# Patient Record
Sex: MALE | Race: WHITE | NOT HISPANIC OR LATINO | Employment: FULL TIME | URBAN - METROPOLITAN AREA
[De-identification: names, ages, dates, MRNs, and addresses within clinical notes are randomized per-mention and may not be internally consistent; named-entity substitution may affect disease eponyms.]

---

## 2018-10-29 ENCOUNTER — HOSPITAL ENCOUNTER (OUTPATIENT)
Dept: GENERAL RADIOLOGY | Facility: HOSPITAL | Age: 48
Discharge: HOME | End: 2018-10-29
Attending: INTERNAL MEDICINE | Admitting: INTERNAL MEDICINE

## 2018-10-29 ENCOUNTER — TRANSCRIBE ORDERS (OUTPATIENT)
Dept: ADMINISTRATIVE | Facility: HOSPITAL | Age: 48
End: 2018-10-29

## 2018-10-29 DIAGNOSIS — M54.6 THORACIC BACK PAIN, UNSPECIFIED BACK PAIN LATERALITY, UNSPECIFIED CHRONICITY: ICD-10-CM

## 2018-10-29 DIAGNOSIS — M25.511 CHRONIC RIGHT SHOULDER PAIN: Primary | ICD-10-CM

## 2018-10-29 DIAGNOSIS — G89.29 CHRONIC RIGHT SHOULDER PAIN: Primary | ICD-10-CM

## 2018-10-29 DIAGNOSIS — M25.511 CHRONIC RIGHT SHOULDER PAIN: ICD-10-CM

## 2018-10-29 DIAGNOSIS — G89.29 CHRONIC RIGHT SHOULDER PAIN: ICD-10-CM

## 2018-10-29 PROCEDURE — 73030 X-RAY EXAM OF SHOULDER: CPT

## 2018-10-29 PROCEDURE — 72072 X-RAY EXAM THORAC SPINE 3VWS: CPT

## 2018-11-01 ENCOUNTER — TRANSCRIBE ORDERS (OUTPATIENT)
Dept: ADMINISTRATIVE | Facility: HOSPITAL | Age: 48
End: 2018-11-01

## 2018-11-01 DIAGNOSIS — G89.29 CHRONIC RIGHT SHOULDER PAIN: Primary | ICD-10-CM

## 2018-11-01 DIAGNOSIS — M54.6 THORACIC BACK PAIN, UNSPECIFIED BACK PAIN LATERALITY, UNSPECIFIED CHRONICITY: ICD-10-CM

## 2018-11-01 DIAGNOSIS — M25.511 CHRONIC RIGHT SHOULDER PAIN: Primary | ICD-10-CM

## 2018-11-09 ENCOUNTER — HOSPITAL ENCOUNTER (OUTPATIENT)
Dept: MRI IMAGING | Facility: HOSPITAL | Age: 48
Discharge: HOME | End: 2018-11-09
Attending: INTERNAL MEDICINE | Admitting: INTERNAL MEDICINE

## 2018-11-09 ENCOUNTER — HOSPITAL ENCOUNTER (OUTPATIENT)
Dept: MRI IMAGING | Facility: HOSPITAL | Age: 48
Discharge: HOME | End: 2018-11-09
Attending: INTERNAL MEDICINE

## 2018-11-09 DIAGNOSIS — M25.511 CHRONIC RIGHT SHOULDER PAIN: ICD-10-CM

## 2018-11-09 DIAGNOSIS — G89.29 CHRONIC RIGHT SHOULDER PAIN: ICD-10-CM

## 2018-11-09 DIAGNOSIS — M54.6 THORACIC BACK PAIN, UNSPECIFIED BACK PAIN LATERALITY, UNSPECIFIED CHRONICITY: ICD-10-CM

## 2018-11-09 PROCEDURE — 73221 MRI JOINT UPR EXTREM W/O DYE: CPT

## 2018-11-09 PROCEDURE — 72146 MRI CHEST SPINE W/O DYE: CPT

## 2018-11-14 ENCOUNTER — APPOINTMENT (OUTPATIENT)
Dept: MRI IMAGING | Facility: HOSPITAL | Age: 48
End: 2018-11-14
Attending: INTERNAL MEDICINE

## 2018-12-26 ENCOUNTER — HOSPITAL ENCOUNTER (OUTPATIENT)
Dept: GENERAL RADIOLOGY | Facility: HOSPITAL | Age: 48
Discharge: HOME | End: 2018-12-26
Admitting: NURSE PRACTITIONER

## 2018-12-26 ENCOUNTER — TRANSCRIBE ORDERS (OUTPATIENT)
Dept: ADMINISTRATIVE | Facility: HOSPITAL | Age: 48
End: 2018-12-26

## 2018-12-26 DIAGNOSIS — R29.898 SUSPECTED FRACTURE OF BONE: Primary | ICD-10-CM

## 2018-12-26 PROCEDURE — 73630 X-RAY EXAM OF FOOT: CPT

## 2019-09-19 ENCOUNTER — TRANSCRIBE ORDERS (OUTPATIENT)
Dept: ADMINISTRATIVE | Facility: HOSPITAL | Age: 49
End: 2019-09-19

## 2019-09-19 DIAGNOSIS — G43.901 STATUS MIGRAINOSUS: Primary | ICD-10-CM

## 2019-09-26 ENCOUNTER — HOSPITAL ENCOUNTER (OUTPATIENT)
Dept: MRI IMAGING | Facility: HOSPITAL | Age: 49
Discharge: HOME | End: 2019-09-26
Admitting: INTERNAL MEDICINE

## 2019-09-26 DIAGNOSIS — G43.901 STATUS MIGRAINOSUS: ICD-10-CM

## 2019-09-26 PROCEDURE — 70551 MRI BRAIN STEM W/O DYE: CPT

## 2024-12-20 ENCOUNTER — HOSPITAL ENCOUNTER (EMERGENCY)
Facility: HOSPITAL | Age: 54
Discharge: HOME | End: 2024-12-21
Attending: STUDENT IN AN ORGANIZED HEALTH CARE EDUCATION/TRAINING PROGRAM
Payer: COMMERCIAL

## 2024-12-20 ENCOUNTER — APPOINTMENT (OUTPATIENT)
Facility: HOSPITAL | Age: 54
End: 2024-12-20
Payer: COMMERCIAL

## 2024-12-20 DIAGNOSIS — F10.920 ALCOHOLIC INTOXICATION WITHOUT COMPLICATION: Primary | ICD-10-CM

## 2024-12-20 DIAGNOSIS — F99 PSYCHIATRIC PROBLEM: ICD-10-CM

## 2024-12-20 DIAGNOSIS — S42.251A CLOSED DISPLACED FRACTURE OF GREATER TUBEROSITY OF RIGHT HUMERUS, INITIAL ENCOUNTER: ICD-10-CM

## 2024-12-20 LAB
ALBUMIN SERPL-MCNC: 4.9 G/DL (ref 3.5–5.2)
ALBUMIN/GLOB SERPL: 1.9 G/DL
ALP SERPL-CCNC: 57 U/L (ref 39–117)
ALT SERPL W P-5'-P-CCNC: 32 U/L (ref 1–41)
AMPHET+METHAMPHET UR QL: NEGATIVE
AMPHETAMINES UR QL: NEGATIVE
ANION GAP SERPL CALCULATED.3IONS-SCNC: 9.7 MMOL/L (ref 5–15)
APAP SERPL-MCNC: <5 MCG/ML (ref 0–30)
AST SERPL-CCNC: 38 U/L (ref 1–40)
BARBITURATES UR QL SCN: NEGATIVE
BASOPHILS # BLD AUTO: 0.02 10*3/MM3 (ref 0–0.2)
BASOPHILS NFR BLD AUTO: 0.4 % (ref 0–1.5)
BENZODIAZ UR QL SCN: NEGATIVE
BILIRUB SERPL-MCNC: <0.2 MG/DL (ref 0–1.2)
BUN SERPL-MCNC: 12 MG/DL (ref 6–20)
BUN/CREAT SERPL: 11.8 (ref 7–25)
BUPRENORPHINE SERPL-MCNC: NEGATIVE NG/ML
CALCIUM SPEC-SCNC: 9.4 MG/DL (ref 8.6–10.5)
CANNABINOIDS SERPL QL: NEGATIVE
CHLORIDE SERPL-SCNC: 107 MMOL/L (ref 98–107)
CO2 SERPL-SCNC: 28.3 MMOL/L (ref 22–29)
COCAINE UR QL: NEGATIVE
CREAT SERPL-MCNC: 1.02 MG/DL (ref 0.76–1.27)
DEPRECATED RDW RBC AUTO: 40.4 FL (ref 37–54)
EGFRCR SERPLBLD CKD-EPI 2021: 88.4 ML/MIN/1.73
EOSINOPHIL # BLD AUTO: 0.02 10*3/MM3 (ref 0–0.4)
EOSINOPHIL NFR BLD AUTO: 0.4 % (ref 0.3–6.2)
ERYTHROCYTE [DISTWIDTH] IN BLOOD BY AUTOMATED COUNT: 12.2 % (ref 12.3–15.4)
ETHANOL BLD-MCNC: 297 MG/DL (ref 0–10)
FENTANYL UR-MCNC: NEGATIVE NG/ML
GLOBULIN UR ELPH-MCNC: 2.6 GM/DL
GLUCOSE SERPL-MCNC: 92 MG/DL (ref 65–99)
HCT VFR BLD AUTO: 45.4 % (ref 37.5–51)
HGB BLD-MCNC: 14.9 G/DL (ref 13–17.7)
HOLD SPECIMEN: NORMAL
IMM GRANULOCYTES # BLD AUTO: 0.01 10*3/MM3 (ref 0–0.05)
IMM GRANULOCYTES NFR BLD AUTO: 0.2 % (ref 0–0.5)
LYMPHOCYTES # BLD AUTO: 2.08 10*3/MM3 (ref 0.7–3.1)
LYMPHOCYTES NFR BLD AUTO: 38.5 % (ref 19.6–45.3)
MCH RBC QN AUTO: 29.3 PG (ref 26.6–33)
MCHC RBC AUTO-ENTMCNC: 32.8 G/DL (ref 31.5–35.7)
MCV RBC AUTO: 89.2 FL (ref 79–97)
METHADONE UR QL SCN: NEGATIVE
MONOCYTES # BLD AUTO: 0.23 10*3/MM3 (ref 0.1–0.9)
MONOCYTES NFR BLD AUTO: 4.3 % (ref 5–12)
NEUTROPHILS NFR BLD AUTO: 3.04 10*3/MM3 (ref 1.7–7)
NEUTROPHILS NFR BLD AUTO: 56.2 % (ref 42.7–76)
OPIATES UR QL: NEGATIVE
OXYCODONE UR QL SCN: NEGATIVE
PCP UR QL SCN: NEGATIVE
PLATELET # BLD AUTO: 351 10*3/MM3 (ref 140–450)
PMV BLD AUTO: 10.7 FL (ref 6–12)
POTASSIUM SERPL-SCNC: 4.3 MMOL/L (ref 3.5–5.2)
PROT SERPL-MCNC: 7.5 G/DL (ref 6–8.5)
RBC # BLD AUTO: 5.09 10*6/MM3 (ref 4.14–5.8)
SALICYLATES SERPL-MCNC: <0.5 MG/DL
SODIUM SERPL-SCNC: 145 MMOL/L (ref 136–145)
TRICYCLICS UR QL SCN: NEGATIVE
WBC NRBC COR # BLD AUTO: 5.4 10*3/MM3 (ref 3.4–10.8)
WHOLE BLOOD HOLD SPECIMEN: NORMAL

## 2024-12-20 PROCEDURE — 99285 EMERGENCY DEPT VISIT HI MDM: CPT

## 2024-12-20 PROCEDURE — 82077 ASSAY SPEC XCP UR&BREATH IA: CPT | Performed by: STUDENT IN AN ORGANIZED HEALTH CARE EDUCATION/TRAINING PROGRAM

## 2024-12-20 PROCEDURE — 80143 DRUG ASSAY ACETAMINOPHEN: CPT | Performed by: STUDENT IN AN ORGANIZED HEALTH CARE EDUCATION/TRAINING PROGRAM

## 2024-12-20 PROCEDURE — 93005 ELECTROCARDIOGRAM TRACING: CPT | Performed by: STUDENT IN AN ORGANIZED HEALTH CARE EDUCATION/TRAINING PROGRAM

## 2024-12-20 PROCEDURE — 80179 DRUG ASSAY SALICYLATE: CPT | Performed by: STUDENT IN AN ORGANIZED HEALTH CARE EDUCATION/TRAINING PROGRAM

## 2024-12-20 PROCEDURE — 80053 COMPREHEN METABOLIC PANEL: CPT | Performed by: STUDENT IN AN ORGANIZED HEALTH CARE EDUCATION/TRAINING PROGRAM

## 2024-12-20 PROCEDURE — 36415 COLL VENOUS BLD VENIPUNCTURE: CPT

## 2024-12-20 PROCEDURE — 73030 X-RAY EXAM OF SHOULDER: CPT

## 2024-12-20 PROCEDURE — 80307 DRUG TEST PRSMV CHEM ANLYZR: CPT | Performed by: STUDENT IN AN ORGANIZED HEALTH CARE EDUCATION/TRAINING PROGRAM

## 2024-12-20 PROCEDURE — 85025 COMPLETE CBC W/AUTO DIFF WBC: CPT | Performed by: STUDENT IN AN ORGANIZED HEALTH CARE EDUCATION/TRAINING PROGRAM

## 2024-12-20 RX ORDER — FENOFIBRATE 145 MG/1
1 TABLET, COATED ORAL DAILY
COMMUNITY

## 2024-12-20 RX ORDER — SODIUM CHLORIDE 0.9 % (FLUSH) 0.9 %
10 SYRINGE (ML) INJECTION AS NEEDED
Status: DISCONTINUED | OUTPATIENT
Start: 2024-12-20 | End: 2024-12-21 | Stop reason: HOSPADM

## 2024-12-20 RX ORDER — METOPROLOL SUCCINATE 50 MG/1
1 TABLET, EXTENDED RELEASE ORAL DAILY
COMMUNITY

## 2024-12-20 RX ORDER — IBUPROFEN 200 MG
400 TABLET ORAL ONCE
Status: COMPLETED | OUTPATIENT
Start: 2024-12-20 | End: 2024-12-21

## 2024-12-21 VITALS
HEART RATE: 73 BPM | WEIGHT: 188 LBS | BODY MASS INDEX: 29.51 KG/M2 | RESPIRATION RATE: 19 BRPM | SYSTOLIC BLOOD PRESSURE: 152 MMHG | OXYGEN SATURATION: 96 % | HEIGHT: 67 IN | TEMPERATURE: 97.9 F | DIASTOLIC BLOOD PRESSURE: 98 MMHG

## 2024-12-21 LAB
ETHANOL BLD-MCNC: 174 MG/DL (ref 0–10)
ETHANOL BLD-MCNC: 92 MG/DL (ref 0–10)

## 2024-12-21 PROCEDURE — 82077 ASSAY SPEC XCP UR&BREATH IA: CPT | Performed by: STUDENT IN AN ORGANIZED HEALTH CARE EDUCATION/TRAINING PROGRAM

## 2024-12-21 RX ADMIN — IBUPROFEN 400 MG: 200 TABLET, FILM COATED ORAL at 04:27
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
## 2024-12-21 NOTE — FSED PROVIDER NOTE
"Subjective  History of Present Illness:    Patient is a 54-year-old male with history of hypertension, alcohol use disorder presents to the emergency department with wife for concerns of suicidal ideation and alcohol intoxication.  Wife states that patient drink heavily today due to it being the holidays.  States that he was making statements that he would be better off dead.  She states that he does not remember saying this as he has been intoxicated.  She tried to take him to the [facility] for inpatient treatment, however, she was directed to come to the emergency department due to his intoxication.  Patient currently denies any SI, HI, hallucinations.  He states that he has had alcohol withdrawals in the past but he has never had alcohol withdrawal seizures.  States that he does not drink every day.  Patient notes having right shoulder pain due to a fall that occurred a week ago.  Otherwise, denies chest pain, shortness of breath, headache.      Nurses Notes reviewed and agree, including vitals, allergies, social history and prior medical history.     REVIEW OF SYSTEMS: All systems reviewed and not pertinent unless noted.  Review of Systems    Past Medical History:   Diagnosis Date    GERD (gastroesophageal reflux disease)     Hypertension     Pancreatitis        Allergies:    Patient has no known allergies.      Past Surgical History:   Procedure Laterality Date    HERNIA REPAIR           Social History     Socioeconomic History    Marital status:          History reviewed. No pertinent family history.    Objective  Physical Exam:  /98 (BP Location: Left arm, Patient Position: Lying)   Pulse 73   Temp 97.9 °F (36.6 °C) (Oral)   Resp 19   Ht 170.2 cm (67\")   Wt 85.3 kg (188 lb)   SpO2 96%   BMI 29.44 kg/m²      Physical Exam  HENT:      Head: Normocephalic and atraumatic.   Eyes:      Extraocular Movements: Extraocular movements intact.      Pupils: Pupils are equal, round, and reactive to light. "   Cardiovascular:      Rate and Rhythm: Normal rate and regular rhythm.   Pulmonary:      Effort: Pulmonary effort is normal.      Breath sounds: Normal breath sounds.   Abdominal:      General: There is no distension.      Tenderness: There is no abdominal tenderness.   Musculoskeletal:      Cervical back: No tenderness.      Comments: Tenderness to right shoulder with limited abduction, radial pulse 2+   Neurological:      General: No focal deficit present.      Mental Status: He is alert.         FX Dislocation    Date/Time: 12/21/2024 4:15 AM    Performed by: Reta Andre MD  Authorized by: Reta Andre MD    Consent:     Consent obtained:  Verbal    Consent given by:  Patient  Procedure details:     Immobilization:  Sling  Post-procedure details:     Distal neurologic exam:  Normal    Distal perfusion: distal pulses strong    Comments:      Fracture not requiring manipulation.  Closed fracture of right humerus greater tuberosity.  Patient was placed in sling for immobilization.  Remained neurovascularly intact.  Recommend follow-up with orthopedics in 5 to 7 days for      ED Course:         Lab Results (last 24 hours)       Procedure Component Value Units Date/Time    Ethanol [[id number]]  (Abnormal) Collected: 12/21/24 0213    Specimen: Blood Updated: 12/21/24 0239     Ethanol 174 mg/dL     Narrative:      Elevated lactic acid concentration and lactate dehydrogenase(LD) activity may falsely elevate enzymatically determined ethanol levels. Not for legal purposes.     Ethanol [[id number]]  (Abnormal) Collected: 12/21/24 0504    Specimen: Blood Updated: 12/21/24 0531     Ethanol 92 mg/dL     Narrative:      Elevated lactic acid concentration and lactate dehydrogenase(LD) activity may falsely elevate enzymatically determined ethanol levels. Not for legal purposes.              XR Shoulder 2+ View Right    Result Date: 12/20/2024  XR SHOULDER 2+ VW RIGHT Date of Exam: 12/20/2024 10:17 PM EST Indication:  fall, injury Comparison: 2018 Findings: There is a minimally displaced fracture of the greater tuberosity. No evidence of additional fracture. Normal joint alignment. Soft tissues are unremarkable.     Impression: Impression: Minimally displaced fracture of the greater tuberosity. Electronically Signed: Mike Kim MD  12/20/2024 10:31 PM EST  Workstation ID: [id number]        MDM  Number of Diagnoses or Management Options  Alcoholic intoxication without complication  Closed displaced fracture of greater tuberosity of right humerus, initial encounter  Psychiatric problem  Diagnosis management comments: 54-year-old male presenting with wife for suicidal statements made in the setting of alcohol intoxication.  On initial presentation, patient is awake and alert.  He answers all questions appropriately and does not have slurred speech.  He denies any current SI, HI, hallucinations.  He does have mild tenderness to the right shoulder and limited abduction he states from a fall 1 week ago.  Radial pulses 2+.  Given that he was making suicidal statements and patient family requesting.  Alcohol detoxification, will plan to have psychiatry evaluate the patient is medically cleared.  Lab work was overall unremarkable.  Alcohol level was 297.  Patient will be monitored until alcohol level has dropped below 100 for psychiatric evaluation.  Will also monitor for withdrawal symptoms which she does not currently have.  X-ray showed a greater tuberosity fracture.  Will place in sling.  EKG was concerning for potential type II Brugada which would be incidentally found.  Patient denies any chest pain, shortness of breath, syncopal episodes, family history of sudden cardiac death at a young age.  I do believe this is an incidental finding and can have further evaluation as an outpatient with cardiology.  On multiple reexaminations, patient has remained well-appearing.  Alcohol level now below 100.  Will have psychiatry evaluate  patient at this time.    Patient has continued to deny any SI, HI, hallucinations.  However, they are currently discussing whether patient would like to go to inpatient rehab which they will be able to assist if patient and wife decide to pursue this option.        My independent interpretation of shoulder xray: greater tuberosity fracture      Discussion of management with other physician/healthcare provider/other source: Spoke to Carmen with behavioral health who has evaluated patient and will evaluate for rehab options.        Medications   ibuprofen (ADVIL,MOTRIN) tablet 400 mg (400 mg Oral Given 12/21/24 0427)       -----  ED Disposition       ED Disposition   Discharge    Condition   Stable    Comment   --             Final diagnoses:   Alcoholic intoxication without complication   Psychiatric problem   Closed displaced fracture of greater tuberosity of right humerus, initial encounter      Your Follow-Up Providers       Jace Suggs MD In 1 week.    Specialty: Orthopedic Surgery  Follow up details: Follow-up in 5-7 days.  [address]  [place] [address]  [phone]               Josh Garcia MD. Schedule an appointment as soon as possible for a visit in 1 week.    Specialty: Cardiology  [address]  [place] [address]  [phone]                       Contact information for after-discharge care    Follow-up information has not been specified.                    Your medication list        CONTINUE taking these medications        Instructions Last Dose Given Next Dose Due   fenofibrate 145 MG tablet  Commonly known as: TRICOR      Take 1 tablet by mouth Daily.       metoprolol succinate XL 50 MG 24 hr tablet  Commonly known as: TOPROL-XL      Take 1 tablet by mouth Daily.       omeprazole 20 MG capsule  Commonly known as: priLOSEC      TAKE 1 CAPSULE BY MOUTH ONCE DAILY IN THE MORNING 30 MINUTES BEFORE MORNING MEAL

## 2024-12-21 NOTE — CONSULTS
"Therapist received a call from Rei DEWITT stating that patient \"is refusing to call his wife and said they will figure out rehab at another time\". No further needs from Behavioral Health at this time.     JENNIE Deutsch  "

## 2024-12-21 NOTE — CONSULTS
Carlos Pagan  1970    This provider is located at [facility] ([address] [place], [address]) using a secure Teams video visit. Patient is being seen remotely via telehealth at [address] [place] [address], [address], and stated they are in a secure environment for this session. The patient's condition being diagnosed/treated is appropriate for telemedicine. The provider identified themselves as well as their credentials. The patient, and/or patients guardian, consent to be seen remotely, and when consent is given they understand that the consent allows for patient identifiable information to be sent to a third party as needed. They may refuse to be seen remotely at any time. The electronic data is encrypted and password protected, and the patient and/or guardian has been advised of the potential risks to privacy not withstanding such measures.    Preferred Pronouns: He/him  Race/Ethnicity: White or   Martial Status:   Guardian Name/Contact/etc: Self  Pt Lives With: Wife  Occupation:   Appearance: clean and casually dressed, appropriate     Time Called for Assessment: 5:40  Assessment Start and End: 5:50 to 6:15      DATA:   Clinician received a call from [facility] staff for a behavioral health consult. The patient is agreeable to speak with the behavioral health team. Met with patient at bedside through telehealth. Patient is under 1:1 security monitoring. The attending treatment team is ESDRAS Crowley and Dr. Andre, provider. Patient presents today with chief compliant of alcohol abuse. Clinician completed assessment with patient and observations are documented as follows.      ASSESSMENT:    Clinician consulted with patient for mental status exam and assessment. Clinical descriptors are documented as follows. Clinician completed CSSRS with patient for suicide risk assessment. The results of patient’s CSSRS documented as follows.    Presenting  Problems: Patient reports that his wife brought him to [facility] last night for detox but they sent him to the ER for medical clearance. Patient reports that he drinks about a pint of liquor daily. Patient adamantly denies SI. States that he cannot remember what he said last night, but believe that he said it to get attention. Patient denies history of SI or attempts.  Current Stressors: Mom passed couple years; son came out as transgender two years ago    Established Therapy, Medication Management or Other Mental Health Services: None currently.  Current Psychiatric Medications: Hydroxyzine      Mental Status Exam:  Behavior: Appropriate  Psychomotor Movement: Appropriate  Attention and Cooperation: Normal and Cooperative  Mood: appropriate to circumstances and Affect: Appropriate  Orientation: alert and oriented to person, place, and time   Thought Process: linear, logical, and goal directed  Thought Content: normal  Delusions: None displayed   Hallucinations: None   Concentration: Normal  Suicidal Ideation: Absent  Homicidal Ideation: Absent  Hopelessness: no  Speech: Normal  Eye Contact: Fair  Insight:  Judgement:     Depression: 7   Anxiety: 3  Sleep: Fair   Appetite: Good       Hx of Psychiatric or Detox Hospitalizations: Detox in [place]  Most recent inpatient admission: 10 years ago    Suicidal Ideation Assessment:    COLUMBIA-SUICIDE SEVERITY RATING SCALE  Psychiatric Inpatient Setting - Discharge Screener    Ask questions that are bold and underlined Discharge   Ask Questions 1 and 2 YES NO   Wish to be Dead:   Person endorses thoughts about a wish to be dead or not alive anymore, or wish to fall asleep and not wake up.  While you were here in the hospital, have you wished you were dead or wished you could go to sleep and not wake up?  X   Suicidal Thoughts:   General non-specific thoughts of wanting to end one's life/die by suicide, “I've thought about killing myself” without general thoughts of ways  to kill oneself/associated methods, intent, or plan.   While you were here in the hospital, have you actually had thoughts about killing yourself?   X   If YES to 2, ask questions 3, 4, 5, and 6.  If NO to 2, go directly to question 6   3) Suicidal Thoughts with Method (without Specific Plan or Intent to Act):   Person endorses thoughts of suicide and has thought of a least one method during the assessment period. This is different than a specific plan with time, place or method details worked out. “I thought about taking an overdose but I never made a specific plan as to when where or how I would actually do it….and I would never go through with it.”   Have you been thinking about how you might kill yourself?      4) Suicidal Intent (without Specific Plan):   Active suicidal thoughts of killing oneself and patient reports having some intent to act on such thoughts, as opposed to “I have the thoughts but I definitely will not do anything about them.”   Have you had these thoughts and had some intention of acting on them or do you have some intention of acting on them after you leave the hospital?      5) Suicide Intent with Specific Plan:   Thoughts of killing oneself with details of plan fully or partially worked out and person has some intent to carry it out.   Have you started to work out or worked out the details of how to kill yourself either for while you were here in the hospital or for after you leave the hospital? Do you intend to carry out this plan?        6) Suicide Behavior    While you were here in the hospital, have you done anything, started to do anything, or prepared to do anything to end your life?    Examples: Took pills, cut yourself, tried to hang yourself, took out pills but didn't swallow any because you changed your mind or someone took them from you, collected pills, secured a means of obtaining a gun, gave away valuables, wrote a will or suicide note, etc.  X     Suicidal: Absent  Previous  "Attempts: Denies    Psychosocial History    Highest Level of Education: college graduate   Family Hx of Mental Health/Substance Abuse: AUD in family  Patient Trauma/Abuse History: Denies  Does this require reporting: N/A  Patient Identified Support System (List family members, loved ones, guardians, friends, etc): Wife    Legal History / History of Violence: Denies significant history of legal issues.   Experience with Interpersonal Violence: No  History of Inappropriate Sexual Behavior: Unknown to clinician  Current Medical Conditions or Biomedical Complications: Patient reports that he fractured his shoulder recently    Social Determinants of Health  Housing Instability and/or Utility Needs: No  Food Insecurity: No  Transportation Needs: No    Substance Use History  Active Use: 1 pint of liquor daily  Does the patient have history or current MAT/MOUD: No; patient reports that he tried naltrexone but did not like the side effects  Withdrawal Symptoms: No  History of DT's: \"maybe\"  History of Seizures: No      PLAN:  At this time, clinician recommends alcohol abuse rehab based upon the above assessment. Clinician collaborated with the treatment team who agree to adopt the recommendations. Clinician discussed recommendations with patient and/or patient support systems, and patient reports that he would like to wait until his wife comes to the hospital to make a decision.     Have the levels of care been discussed with the patient? Yes  Level of care recommendation: Inpatient rehab  Is patient agreeable to treatment? Patient is undecided    Safety Planning:  Does the patient have access to weapons or firearms? No  Did clinician discuss securing weapons, firearms, sharps and/or medications with the patient? Yes  Safety Plan: Clinician verbally engaged in safety planning by assisting the patient in identifying risk factors that would indicate the need for higher level of care, such as thoughts to harm self or others " and/or self-harming behavior(s). Safety plan of report to nearest hospital, calling local police or 911 if feeling unsafe, if having suicidal or homicidal thoughts, or if in emergent need of medications verbally reviewed with patient during assessment and suicide prevention/crisis hotlines verbally reviewed with patient during assessment. Patient during assessment verbally agreed to safety plan. Reviewed resources of crisis hotlines or presenting to the nearest emergency department should symptoms worsen, or in any crisis/emergency. Patient agreeable and voiced understanding.     Care Coordination:  Clinician updated treatment team of completed assessment and recommendation. Patient is currently waiting for his wife to arrive at the hospital to make a decision about whether or not he would like a referral to inpatient rehab for alcohol abuse. Day shift navigator to follow-up.      SIGNATURE  JENNIE Archer  12/21/2024

## 2024-12-23 LAB
QT INTERVAL: 354 MS
QTC INTERVAL: 415 MS

## 2025-01-06 LAB
QT INTERVAL: 354 MS
QTC INTERVAL: 415 MS